# Patient Record
Sex: FEMALE | Race: WHITE | NOT HISPANIC OR LATINO | URBAN - METROPOLITAN AREA
[De-identification: names, ages, dates, MRNs, and addresses within clinical notes are randomized per-mention and may not be internally consistent; named-entity substitution may affect disease eponyms.]

---

## 2017-11-10 ENCOUNTER — GENERIC CONVERSION - ENCOUNTER (OUTPATIENT)
Dept: OTHER | Facility: OTHER | Age: 37
End: 2017-11-10

## 2018-01-18 NOTE — PROGRESS NOTES
Assessment    1  UTI symptoms (157 26) (R39 9)    Plan  UTI symptoms    · Nitrofurantoin Monohyd Macro 100 MG Oral Capsule; 1 tab PO bid x 5 days   · Drink plenty of fluids ; Status:Complete;   Done: [id number]   · There are several things women can do to treat and prevent bladder infections ;  Status:Complete;   Done: [id number]    Discussion/Summary    UTI symptoms   - Macrobid x 5 days prescribed, complete as directed   - continue Azo as needed and drink plenty of fluids   - if symptoms persist despite treatment or worsen, follow up w/ PCP for re-check   Possible side effects of new medications were reviewed with the patient/guardian today  The treatment plan was reviewed with the patient/guardian  The patient/guardian understands and agrees with the treatment plan     Follow Up with your Primary Care Provider in 2-3 days  If your symptoms worsen follow up at the nearest [place] [address] Emergency Room  If your symptoms worsen follow up at the nearest Emergency Room  Chief Complaint  UTI symptoms      History of Present Illness  39 yo female presents c/o dysuria, and increased urinary urgency and frequency x 3 days  Has noticed some blood in the urine  No fever/chills  No abdominal pain  No back/flank pain  No nausea/vomiting or diarrhea  No vaginal bleeding or discharge  Denies any chance of pregnancy  No known allergies  Has been taking Azo as needed, otherwise is on no medications  Denies any medical history  Review of Systems    Constitutional: No fever, no chills, feels well, no tiredness, no recent weight gain or loss  Gastrointestinal: no complaints of abdominal pain, no constipation, no nausea or diarrhea, no vomiting, no bloody stools  Genitourinary: as noted in HPI  Past Medical History    The active problems and past medical history were reviewed and updated today  Current Meds   1  No Reported Medications Recorded    The medication list was reviewed and updated today  Observations Made  Patient is AAOx3 in NAD   no tenderness to palpation when asked to palpate lower abdominal region and flank region        Signatures   Electronically signed by : VIJAY Arango ; Nov 10 2017  6:19PM EST                       (Author)

## 2019-07-05 ENCOUNTER — AMB VIDEO VISIT (OUTPATIENT)
Dept: OTHER | Facility: HOSPITAL | Age: 39
End: 2019-07-05

## 2019-07-05 PROCEDURE — EVISIT: Performed by: PHYSICIAN ASSISTANT

## 2019-07-05 NOTE — CARE ANYWHERE EVISITS
Visit Summary for [facility] - Gender: Female - Date of Birth: [id number]  Date: [id number] - Duration: 6 minutes  Patient: Janelle Graham   [facility]  Provider: Andrew Lopez PA-C    Patient Contact Information  Address  [place]  [phone]    Visit Topics  sore throat  [Added By: Self - 2019-07-05]    Conversation Transcripts  [0A][0A] [Notification] Keyla King,  Practice Adm, will help you prepare for your visit  She is assisting Andrew Lopez PA-C, Urgent Care Specialist [0A][Claudette Duncan] Thank you for choosing [facility][0A][Claudette Duncan] The provider will be with you   shortly[0A][Notification] Keyla King has left the room  Dwight Conley thank you[0A][Notification] You are connected with Andrew Lopez PA-C, Urgent Care Specialist [0A][Notification] Marylu Cunningham is located in [place]  [0A][Notification]   Marylu Cunningham has shared health history  Cynthia Duke  [0A]    Diagnosis    Procedures  Value: [id number] Code: CPT-4 UNLISTED E&M SERVICE    Medications Prescribed    No prescriptions ordered    Electronically signed by: Renata Armas(NPI [id number])